# Patient Record
Sex: MALE | Race: WHITE | NOT HISPANIC OR LATINO | URBAN - METROPOLITAN AREA
[De-identification: names, ages, dates, MRNs, and addresses within clinical notes are randomized per-mention and may not be internally consistent; named-entity substitution may affect disease eponyms.]

---

## 2017-10-13 PROBLEM — Z00.00 ENCOUNTER FOR PREVENTIVE HEALTH EXAMINATION: Status: ACTIVE | Noted: 2017-10-13

## 2020-12-31 ENCOUNTER — EMERGENCY (EMERGENCY)
Facility: HOSPITAL | Age: 50
LOS: 1 days | Discharge: HOME | End: 2020-12-31
Attending: EMERGENCY MEDICINE
Payer: MEDICAID

## 2020-12-31 VITALS
HEART RATE: 119 BPM | OXYGEN SATURATION: 96 % | RESPIRATION RATE: 20 BRPM | DIASTOLIC BLOOD PRESSURE: 76 MMHG | TEMPERATURE: 101 F | HEIGHT: 73 IN | WEIGHT: 229.94 LBS | SYSTOLIC BLOOD PRESSURE: 101 MMHG

## 2020-12-31 VITALS
SYSTOLIC BLOOD PRESSURE: 108 MMHG | HEART RATE: 79 BPM | RESPIRATION RATE: 18 BRPM | DIASTOLIC BLOOD PRESSURE: 82 MMHG | OXYGEN SATURATION: 97 % | TEMPERATURE: 99 F

## 2020-12-31 PROCEDURE — 71045 X-RAY EXAM CHEST 1 VIEW: CPT

## 2020-12-31 PROCEDURE — 99284 EMERGENCY DEPT VISIT MOD MDM: CPT

## 2020-12-31 PROCEDURE — 71045 X-RAY EXAM CHEST 1 VIEW: CPT | Mod: 26

## 2020-12-31 RX ORDER — ACETAMINOPHEN 500 MG
975 TABLET ORAL ONCE
Refills: 0 | Status: COMPLETED | OUTPATIENT
Start: 2020-12-31 | End: 2020-12-31

## 2020-12-31 RX ADMIN — Medication 100 MILLIGRAM(S): at 13:02

## 2020-12-31 RX ADMIN — Medication 975 MILLIGRAM(S): at 13:02

## 2020-12-31 NOTE — ED PROVIDER NOTE - PATIENT PORTAL LINK FT
You can access the FollowMyHealth Patient Portal offered by [facility] by registering at the following website: [email or web address]. By joining [employer]’s FollowMyHealth portal, you will also be able to view your health information using other applications (apps) compatible with our system.

## 2020-12-31 NOTE — ED ADULT NURSE NOTE - OBJECTIVE STATEMENT
50 year old A&Ox3 male presents to ED in wheelchair complaining of SOB. Denies PMH, COVID +. Endorses HA and body aches, and was told by family member to come to ED for "monoclonal antibodies." Lungs CTAB, in NAD, speaking full sentences, 98% lying O2 saturation and 96% ambulatory oxygen saturation. 100.8 orally in triage and tachycardic to 119bpm. Rudy MONZON aware. Denies CP, /v/d, f abdominal pain, urinary symptoms,  numbness, tingling in upper and lower extremities, HA, blurry vision. Updated on plan of care.

## 2020-12-31 NOTE — ED PROVIDER NOTE - OBJECTIVE STATEMENT
50M w/ no significant PMHx, known covid+ p/w cough, fever, and myalgias.  Denies SOB.  States headache is debilitating.  Has been taking some migraine med he got from [facility] w/ mild relief.  Takes tylenol but states fever comes back once tylenol wears off.  Denies hx of DM, HTN, HLD, pulmonary pathology.  Spoke to a family member who's a neurosurgeon who told him to come to the ED for receiving monoclonal antibodies.

## 2020-12-31 NOTE — ED PROVIDER NOTE - NSFOLLOWUPINSTRUCTIONS_ED_ALL_ED_FT
You were seen for headache and fever due to COVID.    This is expected and will resolve only when the COVID resolves.  Take tylenol 650mg every 6-8 hours -- refer to the pill bottle for further dosage and safety instructions.    If you start developing significant shortness of breath, or have any concerning symptoms, seek immediate medical attention. See your Primary Doctor this week for follow up -- call to discuss.    Stay well hydrated, eat regular healthy meals, get plenty of rest, continue current medications.    Use Acetaminophen as directed for pain/fever -- see medication warnings.    See COVID-19 information and return instructions in you packet,    Seek immediate medical care for new/worsening symptoms/concerns.

## 2020-12-31 NOTE — ED PROVIDER NOTE - CLINICAL SUMMARY MEDICAL DECISION MAKING FREE TEXT BOX
50M, covid+ p/w headaches and myalgias.  98% on RA at rest, 96% ambulatory sat.  Febrile.  Lungs CTA.  Will get CXR, administer tylenol, tessalon perles.  Will reassess, and if improvement will dc.

## 2020-12-31 NOTE — ED PROVIDER NOTE - NS ED ROS FT
General: denies fever, chills, weight loss/weight gain.  HENT: denies nasal congestion, sore throat, rhinorrhea, ear pain.  Eyes: denies visual changes, blurred vision, eye discharge, eye redness.  Neck: denies neck pain, neck swelling.  CV: denies chest pain, palpitations.  Resp: +cough; denies difficulty breathing.  Abdominal: denies nausea, vomiting, diarrhea, abdominal pain, blood in stool, dark stool.  MSK: +muscle aches; denies bony pain, leg pain, leg swelling.  Neuro: +headaches; denies numbness, tingling, dizziness, lightheadedness.  Skin: denies rashes, cuts, bruises.  Hematologic: denies unexplained bruises.

## 2020-12-31 NOTE — ED PROVIDER NOTE - PROGRESS NOTE DETAILS
Rudy PGY2 - VSS, saturating holding above 95% even w/ ambulation.  Dr. Nath saw pt., who states he can be dc/d.  Pt. aware of all results.  All other questions and concerns have been addressed.  Pt. will be dc/d.

## 2020-12-31 NOTE — ED PROVIDER NOTE - CARE PLAN
Principal Discharge DX:	COVID-19 virus infection   Principal Discharge DX:	Acute bronchitis due to COVID-19 virus

## 2021-01-01 ENCOUNTER — EMERGENCY (EMERGENCY)
Facility: HOSPITAL | Age: 51
LOS: 1 days | Discharge: HOME | End: 2021-01-01
Attending: EMERGENCY MEDICINE
Payer: MEDICAID

## 2021-01-01 VITALS
SYSTOLIC BLOOD PRESSURE: 116 MMHG | HEART RATE: 85 BPM | DIASTOLIC BLOOD PRESSURE: 84 MMHG | OXYGEN SATURATION: 95 % | RESPIRATION RATE: 16 BRPM | TEMPERATURE: 99 F

## 2021-01-01 VITALS
WEIGHT: 229.94 LBS | SYSTOLIC BLOOD PRESSURE: 133 MMHG | DIASTOLIC BLOOD PRESSURE: 87 MMHG | RESPIRATION RATE: 16 BRPM | HEIGHT: 72 IN | HEART RATE: 97 BPM | OXYGEN SATURATION: 96 % | TEMPERATURE: 99 F

## 2021-01-01 LAB
BASOPHILS # BLD AUTO: 0.01 K/UL — SIGNIFICANT CHANGE UP (ref 0–0.2)
BASOPHILS NFR BLD AUTO: 0.1 % — SIGNIFICANT CHANGE UP (ref 0–2)
EOSINOPHIL # BLD AUTO: 0 K/UL — SIGNIFICANT CHANGE UP (ref 0–0.5)
EOSINOPHIL NFR BLD AUTO: 0 % — SIGNIFICANT CHANGE UP (ref 0–6)
HCT VFR BLD CALC: 46.6 % — SIGNIFICANT CHANGE UP (ref 39–50)
HGB BLD-MCNC: 15.9 G/DL — SIGNIFICANT CHANGE UP (ref 13–17)
IMM GRANULOCYTES NFR BLD AUTO: 0.6 % — SIGNIFICANT CHANGE UP (ref 0–1.5)
LYMPHOCYTES # BLD AUTO: 1.06 K/UL — SIGNIFICANT CHANGE UP (ref 1–3.3)
LYMPHOCYTES # BLD AUTO: 12.3 % — LOW (ref 13–44)
MAGNESIUM SERPL-MCNC: 2.1 MG/DL — SIGNIFICANT CHANGE UP (ref 1.6–2.6)
MCHC RBC-ENTMCNC: 30.5 PG — SIGNIFICANT CHANGE UP (ref 27–34)
MCHC RBC-ENTMCNC: 34.1 GM/DL — SIGNIFICANT CHANGE UP (ref 32–36)
MCV RBC AUTO: 89.3 FL — SIGNIFICANT CHANGE UP (ref 80–100)
MONOCYTES # BLD AUTO: 0.87 K/UL — SIGNIFICANT CHANGE UP (ref 0–0.9)
MONOCYTES NFR BLD AUTO: 10.1 % — SIGNIFICANT CHANGE UP (ref 2–14)
NEUTROPHILS # BLD AUTO: 6.64 K/UL — SIGNIFICANT CHANGE UP (ref 1.8–7.4)
NEUTROPHILS NFR BLD AUTO: 76.9 % — SIGNIFICANT CHANGE UP (ref 43–77)
NRBC # BLD: 0 /100 WBCS — SIGNIFICANT CHANGE UP (ref 0–0)
PHOSPHATE SERPL-MCNC: 3.4 MG/DL — SIGNIFICANT CHANGE UP (ref 2.5–4.5)
PLATELET # BLD AUTO: 156 K/UL — SIGNIFICANT CHANGE UP (ref 150–400)
RBC # BLD: 5.22 M/UL — SIGNIFICANT CHANGE UP (ref 4.2–5.8)
RBC # FLD: 11.8 % — SIGNIFICANT CHANGE UP (ref 10.3–14.5)
WBC # BLD: 8.63 K/UL — SIGNIFICANT CHANGE UP (ref 3.8–10.5)
WBC # FLD AUTO: 8.63 K/UL — SIGNIFICANT CHANGE UP (ref 3.8–10.5)

## 2021-01-01 PROCEDURE — 80053 COMPREHEN METABOLIC PANEL: CPT

## 2021-01-01 PROCEDURE — 96374 THER/PROPH/DIAG INJ IV PUSH: CPT

## 2021-01-01 PROCEDURE — 96376 TX/PRO/DX INJ SAME DRUG ADON: CPT

## 2021-01-01 PROCEDURE — 99285 EMERGENCY DEPT VISIT HI MDM: CPT | Mod: 25

## 2021-01-01 PROCEDURE — 83735 ASSAY OF MAGNESIUM: CPT

## 2021-01-01 PROCEDURE — 96375 TX/PRO/DX INJ NEW DRUG ADDON: CPT

## 2021-01-01 PROCEDURE — 70450 CT HEAD/BRAIN W/O DYE: CPT | Mod: 26

## 2021-01-01 PROCEDURE — 85025 COMPLETE CBC W/AUTO DIFF WBC: CPT

## 2021-01-01 PROCEDURE — 84100 ASSAY OF PHOSPHORUS: CPT

## 2021-01-01 PROCEDURE — 99285 EMERGENCY DEPT VISIT HI MDM: CPT

## 2021-01-01 PROCEDURE — 70450 CT HEAD/BRAIN W/O DYE: CPT

## 2021-01-01 RX ORDER — ACETAMINOPHEN 500 MG
975 TABLET ORAL ONCE
Refills: 0 | Status: COMPLETED | OUTPATIENT
Start: 2021-01-01 | End: 2021-01-01

## 2021-01-01 RX ORDER — KETOROLAC TROMETHAMINE 30 MG/ML
15 SYRINGE (ML) INJECTION ONCE
Refills: 0 | Status: DISCONTINUED | OUTPATIENT
Start: 2021-01-01 | End: 2021-01-01

## 2021-01-01 RX ORDER — METOCLOPRAMIDE HCL 10 MG
10 TABLET ORAL ONCE
Refills: 0 | Status: COMPLETED | OUTPATIENT
Start: 2021-01-01 | End: 2021-01-01

## 2021-01-01 RX ORDER — SODIUM CHLORIDE 9 MG/ML
500 INJECTION INTRAMUSCULAR; INTRAVENOUS; SUBCUTANEOUS ONCE
Refills: 0 | Status: COMPLETED | OUTPATIENT
Start: 2021-01-01 | End: 2021-01-01

## 2021-01-01 RX ORDER — MAGNESIUM SULFATE 500 MG/ML
2 VIAL (ML) INJECTION ONCE
Refills: 0 | Status: COMPLETED | OUTPATIENT
Start: 2021-01-01 | End: 2021-01-01

## 2021-01-01 RX ADMIN — Medication 10 MILLIGRAM(S): at 16:29

## 2021-01-01 RX ADMIN — Medication 975 MILLIGRAM(S): at 16:29

## 2021-01-01 RX ADMIN — Medication 50 GRAM(S): at 16:29

## 2021-01-01 RX ADMIN — Medication 15 MILLIGRAM(S): at 14:35

## 2021-01-01 RX ADMIN — SODIUM CHLORIDE 500 MILLILITER(S): 9 INJECTION INTRAMUSCULAR; INTRAVENOUS; SUBCUTANEOUS at 14:35

## 2021-01-01 RX ADMIN — Medication 975 MILLIGRAM(S): at 14:35

## 2021-01-01 RX ADMIN — Medication 15 MILLIGRAM(S): at 16:29

## 2021-01-01 RX ADMIN — Medication 10 MILLIGRAM(S): at 14:35

## 2021-01-01 NOTE — ED PROVIDER NOTE - PHYSICAL EXAMINATION
GENERAL: Patient lying in bed comfortably. In no acute distress. Answering questions appropriately.   HEENT: NC/AT, PERRL, EOMI b/l, conjunctiva noninjected and anicteric,  moist mucous membranes  NECK: No nuchal rigidity. ROM of neck intact. Kernigs and brudzinkis negative trachea midline  LUNG: CTAB, no w/r/r appreciated, good respiratory effort  CV: RRR, no m/r/g appreciated  ABDOMEN: Soft, NTND, no rebound or guarding, no appreciable organomegaly  MSK: No edema, no visible deformities, full range of motion UE/LE b/l, 5/5 muscle strength UE/LE b/l  NEURO: AAOx4 (to person, place, time, event), CN II-XII grossly intact, sensation grossly intact, finger-to-nose smooth and rapid, no pronator drift  SKIN: Warm, dry, well perfused, no evidence of rash  PSYCH: Normal mood and affect

## 2021-01-01 NOTE — ED PROVIDER NOTE - OBJECTIVE STATEMENT
49 yo with pmh migraines, known COVID, now with symptoms for 11 days presenting for headache. Frontal. Severe. Non radiating. Ongoing for past 10 days. States is unrelenting. Has been taking a tryptan for migraine which helps a little. Denies neck pain, photophobia, phonophobia, focal neuro deficit, sob, cp, LE edema.

## 2021-01-01 NOTE — ED ADULT NURSE NOTE - OBJECTIVE STATEMENT
Pt is a 49 yo male with the co consistent headaches for the past several days. Pt states that he had covid 1.5 weeks ago and has been having worsening headaches x 10 days. Pt states that nothing has been helping the headache. He was seen here yesterday in the ED given a chest xray and sent home. Pt spoke to his primary MD this am and his MD was suggesting either a CT head and or spinal tap. Pt denies any CP no dizziness or blurred vision. Pt denies any other complaints at this time

## 2021-01-01 NOTE — ED PROVIDER NOTE - PROGRESS NOTE DETAILS
Attending MD Jones: patient received in sign out  from Dr Banuelos, presumptive dx is acute migraine and secondary covid-19 diagnosis. Patient pending screening CT head for mass or ICH, low suspicion. Meningoencephalitis not clinically suspected either. will continue abortive therapies and reassess Attending MD Jones: patient markedly improved, 3/10 HA. pending CT head Joseph Frankel PGY2: CT head negative. Patient's headache now a 1/10 down from 10/10. Headaches Will dc with follow up. Discussed plan and return precautions with patient who understands and agrees. All questions answered. Joseph Frankel PGY2: CT head negative. Patient's headache now a 1/10 down from 10/10. Continues to be AandOx3 with no nuchal rigidity and no focal neuro deficits. Will dc with neurology follow up. Discussed plan and return precautions with patient who understands and agrees. All questions answered.

## 2021-01-01 NOTE — ED PROVIDER NOTE - ATTENDING CONTRIBUTION TO CARE
49ymale , PMH Migraine Abreu, Comes to ed c/o covid positive since last week. Pt c/o persistent ha past week, No photophobia,stiffneck, nvdc, visual changes. PT states "Just a bad migraine that won't go away"Cough mylagias,fever PE WDWN male looking stated age mild distress. NCAT Neck supple chest scant beverly crackles, cv no rubs, gallops or murmurs, abd soft +bs no mass guarding, rebound neuro gcs 15 speech fluent power 5/5 all extr pain light touch intact  Van Banuelos MD, Facep

## 2021-01-01 NOTE — ED PROVIDER NOTE - NSFOLLOWUPINSTRUCTIONS_ED_ALL_ED_FT
Your headaches are most likely related to COVID and your history of migraine.    - You should receive a call from to help schedule your neurology follow up. I have also attached contact info above.     In the interim,  -Rest and stay well hydrated  -Take over the counter acetaminophen (Tylenol) 650-1000 mg every every 4-6 hours as needed for fever/pain. Do not take more than 4000 mg in a 24 hour period. Be aware many over the counter and prescription medications also contain acetaminophen (Tylenol).   - You may use ibuprofen for pain. Please do not exceed 3000 mg in 24 hours.     For a 14 day period:  -Stay inside your home as much as possible, avoiding public places or public interaction  -Do not go to work  -If you do enter any public domain, at minimum wear a surgical mask at all times  -Even while indoors, attempt to remain isolated from other individuals such as family or friends, as much as possible  -Return to the Emergency Department for any symptoms such as worsening shortness of breath, significant worsening cough, high fevers despite over the counter fever-reducing medications, or severe weakness/malaise     Headache    A headache is pain or discomfort felt around the head or neck area. The specific cause of a headache may not be found as there are many types including tension headaches, migraine headaches, and cluster headaches. Watch your condition for any changes. Things you can do to manage your pain include taking over the counter and prescription medications as instructed by your health care provider, lying down in a dark quiet room, limiting stress, getting regular sleep, and refraining from alcohol and tobacco products.    SEEK IMMEDIATE MEDICAL CARE IF YOU HAVE ANY OF THE FOLLOWING SYMPTOMS: fever, vomiting, stiff neck, loss of vision, problems with speech, muscle weakness, loss of balance, trouble walking, passing out, or confusion.

## 2021-01-01 NOTE — ED PROVIDER NOTE - PATIENT PORTAL LINK FT
You can access the FollowMyHealth Patient Portal offered by [facility] by registering at the following website: [email or web address]. By joining [employer]’s FollowMyHealth portal, you will also be able to view your health information using other applications (apps) compatible with our system.

## 2021-01-01 NOTE — ED PROVIDER NOTE - NS ED ROS FT
Gen: Endorses fever, chills, generalized weakness  CV: Denies chest pain, palpitations  HEENT: Denies neck pain, vision changes  Skin: Denies rash, erythema, color changes  Resp: Denies SOB, cough  GI: Denies  nausea, vomiting, diarrhea  Msk: Denies back pain, LE swelling, extremity pain  : Denies dysuria, increased frequency  Neuro: Denies LOC, focal weakness, numbness, tingling

## 2021-01-01 NOTE — ED PROVIDER NOTE - NSFOLLOWUPCLINICS_GEN_ALL_ED_FT
[facility]  Neurology  [address] [place] [address] [address] [place] [address]  Phone: [phone]  Fax:   Follow Up Time:

## 2022-03-17 NOTE — ED PROVIDER NOTE - ATTENDING CONTRIBUTION TO CARE
normal mood with appropriate affect
------------ATTENDING NOTE------------  pt c/o 1 wk of continued unproductive cough, constant mild central chest ache, fevers, c/w COVID-19, nml VS, tolerating PO, no hypoxia w/ ambulation, evaluated and clear by his PCP in ED, no additional complaints, in depth dw all about ddx, tx, clements, continued close outpt fu.  - Jesus Carrera MD   ----------------------------------------------

## 2022-12-12 NOTE — ED PROVIDER NOTE - CLINICAL SUMMARY MEDICAL DECISION MAKING FREE TEXT BOX
Physical Therapy    Patient not seen in therapy.     Unavailable due to medical tests/procedures.      On hold due to patient status post surgical/invasive medical procedure, will need new orders to resume    Re-attempt plan: per established plan of care      OBJECTIVE                            Therapy procedure time and total treatment time can be found documented on the Time Entry flowsheet   Joseph Frankel PGY2: 48yo M with migraine and COVID presenting with headache. VSS. Patient looks well and is non toxic appearing. PE as above. No clinical suspicion for meningitis at this time. Headaches most likely related to COVID. will get basic labs, treat headache, reassess.

## 2023-01-27 ENCOUNTER — NON-APPOINTMENT (OUTPATIENT)
Age: 53
End: 2023-01-27

## 2023-11-15 NOTE — ED PROVIDER NOTE - CCCP TRG CHIEF CMPLNT
Please call patient with results:   -- no evidence of pancreatic insufficiency  -- GI pathogen panel and bacterial culture negative      Thank you Headache, fever, tested pos for COVID 1 1/2 weeks ago

## 2023-12-12 ENCOUNTER — NON-APPOINTMENT (OUTPATIENT)
Age: 53
End: 2023-12-12

## 2024-01-11 DIAGNOSIS — Z78.9 OTHER SPECIFIED HEALTH STATUS: ICD-10-CM

## 2024-01-11 DIAGNOSIS — Z83.3 FAMILY HISTORY OF DIABETES MELLITUS: ICD-10-CM

## 2024-01-12 ENCOUNTER — APPOINTMENT (OUTPATIENT)
Dept: SURGERY | Facility: CLINIC | Age: 54
End: 2024-01-12
Payer: MEDICAID

## 2024-01-12 VITALS
TEMPERATURE: 97.3 F | HEIGHT: 72 IN | WEIGHT: 227 LBS | OXYGEN SATURATION: 97 % | BODY MASS INDEX: 30.75 KG/M2 | HEART RATE: 79 BPM | SYSTOLIC BLOOD PRESSURE: 119 MMHG | DIASTOLIC BLOOD PRESSURE: 79 MMHG | RESPIRATION RATE: 18 BRPM

## 2024-01-12 DIAGNOSIS — K64.8 OTHER HEMORRHOIDS: ICD-10-CM

## 2024-01-12 DIAGNOSIS — K64.4 RESIDUAL HEMORRHOIDAL SKIN TAGS: ICD-10-CM

## 2024-01-12 PROCEDURE — 99203 OFFICE O/P NEW LOW 30 MIN: CPT | Mod: 25

## 2024-01-12 PROCEDURE — 46600 DIAGNOSTIC ANOSCOPY SPX: CPT

## 2024-01-12 NOTE — ASSESSMENT
[FreeTextEntry1] : In summary the patient has bleeding prolapsing internal hemorrhoids and rectal bleeding.  He has chronic constipation.  He does not take fiber supplementation stool softeners or laxatives.  I recommended he start a daily fiber supplement with Metamucil and recommended rubber band ligations of his enlarged internal hemorrhoids.  I explained the risks benefits and alternatives including the risks of bleeding and infection.  I explained that need to sometimes repeat the procedure.

## 2024-01-12 NOTE — CONSULT LETTER
[Dear  ___] : Dear  [unfilled], [Consult Letter:] : I had the pleasure of evaluating your patient, [unfilled]. [Please see my note below.] : Please see my note below. [Consult Closing:] : Thank you very much for allowing me to participate in the care of this patient.  If you have any questions, please do not hesitate to contact me. [Sincerely,] : Sincerely, [FreeTextEntry3] : Van Magallon M.D., F.A.C.S, F.A.S.C.R.S [DrSarthak  ___] : Dr. APONTE

## 2024-01-12 NOTE — PHYSICAL EXAM
[Normal Breath Sounds] : Normal breath sounds [Normal Heart Sounds] : normal heart sounds [No Rash or Lesion] : No rash or lesion [Alert] : alert [Oriented to Person] : oriented to person [Oriented to Place] : oriented to place [Oriented to Time] : oriented to time [Calm] : calm [Abdomen Tenderness] : ~T No ~M abdominal tenderness [de-identified] : Perianal inspection digital rectal examination and anoscopy reveal enlarged prolapsing internal hemorrhoids worst in the left lateral position.  There is a single enlarged nonthrombosed external hemorrhoid in the left lateral position.  There is no fissure fistula or abscess.  Otherwise distal rectal mucosa is normal. [de-identified] : WNL [de-identified] : WNL [de-identified] : JOSÉ LUISL [de-identified] : WNL ROM [de-identified] : WNL

## 2024-01-12 NOTE — HISTORY OF PRESENT ILLNESS
[FreeTextEntry1] : Burton is a 51 y/o male here for a consultation visit, possible hemorrhoids.   Colonoscopy on 2/10/22 - normal colon, slight friable small hemorrhoids.   Today pt reports feeling anal discomfort after BMs and intermittent BRBPR into toilet bowl and tp with BMs for a couple of years.  Last rectal bleeding episode today into tp, dripping last week.  His previous BMs habit was once daily and now every 3 days since he is trying to lose weight, some straining, pt reports being constipated recently, not taking any stool softener.  Denies prolapsing tissue but does have anal swelling tissue.  No episodes of incontinence of stool or flatus.  Good appetite.  No c/o nausea/vomiting.  Denies fever and chills.  Not on any ACG.  No family history of colorectal cancer.  No abdominal surgery history.

## 2024-04-08 NOTE — ED ADULT NURSE NOTE - CHIEF COMPLAINT
The patient is a 50y Male complaining of  General: Well appearing in no acute distress, alert and cooperative  Head: Normocephalic, atraumatic  Eyes: PERRLA, no conjunctival injection or pallor b/l  Neck: Soft and supple, full ROM without pain, no midline tenderness  Cardiac: Regular rate and regular rhythm, no murmurs, peripheral pulses 2+ and symmetric in all extremities, no LE edema.  Resp: Unlabored respiratory effort, lungs CTAB, speaking in full sentences, no wheezes  Abd: Soft, non-tender, non-distended, no guarding or rebound tenderness. No CVA tenderness b/l.   MSK: Spine midline and non-tender. No calf tenderness or swelling in b/l LE.   Skin: Warm and dry, no rashes/abrasions/lacerations  Neuro: AO x 3, moves all extremities symmetrically, Motor strength 5/5 bilaterally UE and LE, sensation grossly intact

## 2024-06-27 ENCOUNTER — APPOINTMENT (OUTPATIENT)
Dept: ULTRASOUND IMAGING | Facility: CLINIC | Age: 54
End: 2024-06-27
Payer: COMMERCIAL

## 2024-06-27 PROCEDURE — 76700 US EXAM ABDOM COMPLETE: CPT

## 2025-05-22 ENCOUNTER — NON-APPOINTMENT (OUTPATIENT)
Age: 55
End: 2025-05-22

## 2025-05-23 ENCOUNTER — APPOINTMENT (OUTPATIENT)
Dept: SURGERY | Facility: CLINIC | Age: 55
End: 2025-05-23

## 2025-05-23 VITALS
SYSTOLIC BLOOD PRESSURE: 129 MMHG | TEMPERATURE: 96.8 F | OXYGEN SATURATION: 98 % | HEART RATE: 83 BPM | RESPIRATION RATE: 16 BRPM | DIASTOLIC BLOOD PRESSURE: 80 MMHG

## 2025-05-23 DIAGNOSIS — K64.8 OTHER HEMORRHOIDS: ICD-10-CM

## 2025-05-23 PROCEDURE — 46221 LIGATION OF HEMORRHOID(S): CPT
